# Patient Record
Sex: MALE | Race: WHITE | Employment: FULL TIME | URBAN - METROPOLITAN AREA
[De-identification: names, ages, dates, MRNs, and addresses within clinical notes are randomized per-mention and may not be internally consistent; named-entity substitution may affect disease eponyms.]

---

## 2022-08-02 ENCOUNTER — HOSPITAL ENCOUNTER (EMERGENCY)
Age: 52
Discharge: HOME | End: 2022-08-02
Attending: EMERGENCY MEDICINE
Payer: COMMERCIAL

## 2022-08-02 VITALS
HEART RATE: 75 BPM | BODY MASS INDEX: 27.2 KG/M2 | SYSTOLIC BLOOD PRESSURE: 153 MMHG | WEIGHT: 190 LBS | DIASTOLIC BLOOD PRESSURE: 88 MMHG | OXYGEN SATURATION: 98 % | RESPIRATION RATE: 16 BRPM | TEMPERATURE: 98 F | HEIGHT: 70 IN

## 2022-08-02 DIAGNOSIS — E01.0 THYROMEGALY: Primary | ICD-10-CM

## 2022-08-02 LAB
ALBUMIN SERPL-MCNC: 4.1 G/DL (ref 3.4–5)
ALBUMIN/GLOB SERPL: 1.2 {RATIO} (ref 1–2)
ALP LIVER SERPL-CCNC: 65 U/L
ALT SERPL-CCNC: 26 U/L
ANION GAP SERPL CALC-SCNC: 5 MMOL/L (ref 0–18)
AST SERPL-CCNC: 16 U/L (ref 15–37)
BASOPHILS # BLD AUTO: 0.05 X10(3) UL (ref 0–0.2)
BASOPHILS NFR BLD AUTO: 0.6 %
BILIRUB SERPL-MCNC: 0.5 MG/DL (ref 0.1–2)
BUN BLD-MCNC: 18 MG/DL (ref 7–18)
CALCIUM BLD-MCNC: 9.9 MG/DL (ref 8.5–10.1)
CHLORIDE SERPL-SCNC: 102 MMOL/L (ref 98–112)
CO2 SERPL-SCNC: 30 MMOL/L (ref 21–32)
CREAT BLD-MCNC: 1.23 MG/DL
EOSINOPHIL # BLD AUTO: 0.28 X10(3) UL (ref 0–0.7)
EOSINOPHIL NFR BLD AUTO: 3.4 %
ERYTHROCYTE [DISTWIDTH] IN BLOOD BY AUTOMATED COUNT: 13.1 %
GFR SERPLBLD BASED ON 1.73 SQ M-ARVRAT: 72 ML/MIN/1.73M2 (ref 60–?)
GLOBULIN PLAS-MCNC: 3.5 G/DL (ref 2.8–4.4)
GLUCOSE BLD-MCNC: 88 MG/DL (ref 70–99)
HCT VFR BLD AUTO: 43.2 %
HGB BLD-MCNC: 14.3 G/DL
IMM GRANULOCYTES # BLD AUTO: 0.03 X10(3) UL (ref 0–1)
IMM GRANULOCYTES NFR BLD: 0.4 %
LYMPHOCYTES # BLD AUTO: 2.19 X10(3) UL (ref 1–4)
LYMPHOCYTES NFR BLD AUTO: 26.2 %
MCH RBC QN AUTO: 30.8 PG (ref 26–34)
MCHC RBC AUTO-ENTMCNC: 33.1 G/DL (ref 31–37)
MCV RBC AUTO: 92.9 FL
MONOCYTES # BLD AUTO: 0.82 X10(3) UL (ref 0.1–1)
MONOCYTES NFR BLD AUTO: 9.8 %
NEUTROPHILS # BLD AUTO: 4.98 X10 (3) UL (ref 1.5–7.7)
NEUTROPHILS # BLD AUTO: 4.98 X10(3) UL (ref 1.5–7.7)
NEUTROPHILS NFR BLD AUTO: 59.6 %
OSMOLALITY SERPL CALC.SUM OF ELEC: 285 MOSM/KG (ref 275–295)
PLATELET # BLD AUTO: 299 10(3)UL (ref 150–450)
POTASSIUM SERPL-SCNC: 3.7 MMOL/L (ref 3.5–5.1)
PROT SERPL-MCNC: 7.6 G/DL (ref 6.4–8.2)
RBC # BLD AUTO: 4.65 X10(6)UL
SODIUM SERPL-SCNC: 137 MMOL/L (ref 136–145)
WBC # BLD AUTO: 8.4 X10(3) UL (ref 4–11)

## 2022-08-02 PROCEDURE — 85025 COMPLETE CBC W/AUTO DIFF WBC: CPT | Performed by: EMERGENCY MEDICINE

## 2022-08-02 PROCEDURE — 84439 ASSAY OF FREE THYROXINE: CPT | Performed by: EMERGENCY MEDICINE

## 2022-08-02 PROCEDURE — 99283 EMERGENCY DEPT VISIT LOW MDM: CPT

## 2022-08-02 PROCEDURE — 80053 COMPREHEN METABOLIC PANEL: CPT | Performed by: EMERGENCY MEDICINE

## 2022-08-02 PROCEDURE — 36415 COLL VENOUS BLD VENIPUNCTURE: CPT

## 2022-08-02 PROCEDURE — 84481 FREE ASSAY (FT-3): CPT | Performed by: EMERGENCY MEDICINE

## 2022-08-02 PROCEDURE — 84443 ASSAY THYROID STIM HORMONE: CPT | Performed by: EMERGENCY MEDICINE

## 2022-08-03 LAB
T3FREE SERPL-MCNC: 3.51 PG/ML (ref 2.4–4.2)
T4 FREE SERPL-MCNC: 0.9 NG/DL (ref 0.8–1.7)
TSI SER-ACNC: 3.64 MIU/ML (ref 0.36–3.74)

## 2022-08-03 NOTE — ED INITIAL ASSESSMENT (HPI)
PT to the ED for evaluation of rash/swelling. Pt reports it feels like his anterior neck may be swollen. Reports a feeling of tightness in his neck. Denies feeling swelling or tightness in throat. No MATHEW, no  Difficulty swallowing. Managing own secretions in triage. Notes redness to the anterior neck as well.

## 2024-03-16 ENCOUNTER — HOSPITAL ENCOUNTER (EMERGENCY)
Age: 54
Discharge: HOME | End: 2024-03-16
Attending: EMERGENCY MEDICINE
Payer: COMMERCIAL

## 2024-03-16 ENCOUNTER — APPOINTMENT (OUTPATIENT)
Dept: GENERAL RADIOLOGY | Age: 54
End: 2024-03-16
Payer: COMMERCIAL

## 2024-03-16 VITALS
OXYGEN SATURATION: 99 % | WEIGHT: 190 LBS | HEART RATE: 80 BPM | TEMPERATURE: 99 F | RESPIRATION RATE: 18 BRPM | HEIGHT: 70 IN | SYSTOLIC BLOOD PRESSURE: 144 MMHG | BODY MASS INDEX: 27.2 KG/M2 | DIASTOLIC BLOOD PRESSURE: 80 MMHG

## 2024-03-16 DIAGNOSIS — J40 BRONCHITIS: Primary | ICD-10-CM

## 2024-03-16 DIAGNOSIS — H10.31 ACUTE CONJUNCTIVITIS OF RIGHT EYE, UNSPECIFIED ACUTE CONJUNCTIVITIS TYPE: ICD-10-CM

## 2024-03-16 PROCEDURE — 99284 EMERGENCY DEPT VISIT MOD MDM: CPT

## 2024-03-16 PROCEDURE — 71046 X-RAY EXAM CHEST 2 VIEWS: CPT

## 2024-03-16 PROCEDURE — 93010 ELECTROCARDIOGRAM REPORT: CPT

## 2024-03-16 PROCEDURE — 93005 ELECTROCARDIOGRAM TRACING: CPT

## 2024-03-16 RX ORDER — ATORVASTATIN CALCIUM 10 MG/1
10 TABLET, FILM COATED ORAL NIGHTLY
COMMUNITY
Start: 2024-01-17

## 2024-03-16 RX ORDER — ALBUTEROL SULFATE 90 UG/1
2 AEROSOL, METERED RESPIRATORY (INHALATION) EVERY 4 HOURS PRN
Qty: 1 EACH | Refills: 0 | Status: SHIPPED | OUTPATIENT
Start: 2024-03-16 | End: 2024-04-15

## 2024-03-16 RX ORDER — METHYLPREDNISOLONE 4 MG/1
TABLET ORAL
Qty: 1 EACH | Refills: 0 | Status: SHIPPED | OUTPATIENT
Start: 2024-03-16

## 2024-03-16 RX ORDER — BENZONATATE 200 MG/1
1 CAPSULE ORAL 3 TIMES DAILY PRN
COMMUNITY
Start: 2024-03-13

## 2024-03-16 RX ORDER — CIPROFLOXACIN HYDROCHLORIDE 3.5 MG/ML
2 SOLUTION/ DROPS TOPICAL
Qty: 1 EACH | Refills: 0 | Status: SHIPPED | OUTPATIENT
Start: 2024-03-16 | End: 2024-03-26

## 2024-03-16 NOTE — ED PROVIDER NOTES
Patient Seen in: [facility] In [place]      History     Chief Complaint   Patient presents with    Cough/URI    Chest Pain Angina     Stated Complaint: cough, chest pain when coughing    Subjective:   HPI    CHIEF COMPLAINT: Cough, congestion for 1 week     HISTORY OF PRESENT ILLNESS: Patient is a 53-year-old male who presents for evaluation of cough and congestion for the past 1 week.  4 days ago he was seen and evaluated for the same.  Was tested for flu, COVID, RSV and was told all were negative and he should just wait it out.  He started having some chest pain when he coughs so he thought he should get rechecked.  No documented fevers but has been alternating between sweats and chills.  No chest pain at rest or with exertion.  No shortness of breath.  No vomiting or diarrhea.  Reports some drainage from the eyes as well.     REVIEW OF SYSTEMS:  Constitutional: As above  Eyes: As above  ENT: no sore throat  Cardiovascular: no palpitations  Respiratory: As above  Gastrointestinal: no abdominal pain, no vomiting  Genitourinary: no hematuria  Musculoskeletal: no back pain  Skin: no rashes  Neurological: no headache     Otherwise a complete review of systems was obtained and other than the HPI was negative     The patient's medication list, past medical history and social history elements is as listed in today's nurse's notes are reviewed and agree. The patient's family history is reviewed and is noncontributory to the presenting problem, except as indicated as above.    Objective:   History reviewed. No pertinent past medical history.           Past Surgical History:   Procedure Laterality Date    OTHER SURGICAL HISTORY      s/p wisdom tooth extractions    VASECTOMY                  Social History     Socioeconomic History    Marital status:     Number of children: 3   Occupational History    Occupation:    Tobacco Use    Smoking status: Some Days     Types: Cigars     Smokeless tobacco: Never   Vaping Use    Vaping Use: Never used   Substance and Sexual Activity    Alcohol use: Yes     Alcohol/week: 1.7 - 2.5 standard drinks of alcohol     Types: 2 - 3 Standard drinks or equivalent per week     Comment: occasional (2-3 beer/wine weekly)    Drug use: No              Review of Systems    Positive for stated complaint: cough, chest pain when coughing  Other systems are as noted in HPI.  Constitutional and vital signs reviewed.      All other systems reviewed and negative except as noted above.    Physical Exam     ED Triage Vitals [03/16/24 1352]   /86   Pulse 88   Resp 18   Temp 98.5 °F (36.9 °C)   Temp src Oral   SpO2 98 %   O2 Device None (Room air)       Current:/80   Pulse 80   Temp 98.5 °F (36.9 °C) (Oral)   Resp 18   Ht 177.8 cm (5' 10\")   Wt 86.2 kg   SpO2 99%   BMI 27.26 kg/m²         Physical Exam    Vital signs and nursing notes reviewed  General Appearance: Patient is alert and oriented x4 in no acute distress  Eyes: pupils equal and round, reactive to light. + injection, right greater than the left, no sclera icterus.  No purulent drainage noted.  Throat: There is no erythema or exudates, no tonsillar hypertrophy.  no trismus or stridor. Uvula midline. No phonation changes, patient handling secretions well. Mucous membranes moist.  Respiratory: there are no retractions, lungs are clear to auscultation  Cardiovascular: regular rate and rhythm  Chest wall: There is reproducible tenderness to palpation along the left sternal border.  No overlying edema, erythema, warmth or ecchymosis.  No crepitus.  No vesicles or lesions.  Neurological:  Grossly intact, no deficits.  Gait normal.  Skin:  warm and dry, no rashes.  No jaundice. Brisk capillary refill  Musculoskeletal: neck is supple, no lymphadenopathy, non tender. no meningeal signs.  Extremities are symmetrical, full range of motion  Psychiatric: calm and cooperative      ED Course   Labs Reviewed - No  data to display  EKG    Rate, intervals and axes as noted on EKG Report.  Rate: 91 bpm  Rhythm: Sinus Rhythm  Reading: normal sinus rhythm, no evidence of ischemia                 XR CHEST PA + LAT CHEST (CPT=71046)    Result Date: 3/16/2024  PROCEDURE:  XR CHEST PA + LAT CHEST (CPT=71046)  INDICATIONS:  cough, chest pain when coughing  COMPARISON:  None.  TECHNIQUE:  PA and lateral chest radiographs were obtained.  PATIENT STATED HISTORY: (As transcribed by Technologist)  Pt c/o left sided chest pain that began yesterday and cough for 1 week.    FINDINGS:  Heart size is within normal limits.  Pleural spaces appear clear.  Mediastinal and hilar contours are normal.  No focal consolidation.  If clinical symptoms persist then recommend follow-up imaging.            CONCLUSION:  See above.   LOCATION:  [facility]   Dictated by (CST): Mars Reza MD on 3/16/2024 at 2:51 PM     Finalized by (CST): Mars Reza MD on 3/16/2024 at 2:52 PM        I personally reviewed the chest x-ray images.  No consolidation appreciated.         MDM      This is a well-appearing 53-year-old male who presents for evaluation of 1 week of URI symptoms and some chest pain with cough.  Patient had an EKG which showed normal sinus rhythm, no evidence of ischemia.  Chest x-ray showed no focal consolidation.  Symptoms consistent with viral etiology.  Will treat for bronchitis.  ProAir and Medrol Dosepak as prescribed.  Tessalon Perles for cough management as previously prescribed by PCP.  Ciloxan ophthalmic drops as directed.  Close follow-up with primary care.  If there are any new, changing or worsening symptoms go to the ER.  Patient voiced understanding to the treatment plan.  All questions answered. This patient was seen and evaluated with Dr. Aguilar who agrees with the disposition and plan.                                   Medical Decision Making  Amount and/or Complexity of Data Reviewed  External Data Reviewed: notes.     Details: Reviewed  primary care note and plan of care from 3/13/2024.  Viral etiology.  Lucy Vasquez for cough management.  Radiology: ordered and independent interpretation performed. Decision-making details documented in ED Course.    Risk  Prescription drug management.        Disposition and Plan     Clinical Impression:  1. Bronchitis    2. Acute conjunctivitis of right eye, unspecified acute conjunctivitis type         Disposition:  Discharge  3/16/2024  3:21 pm    Follow-up:  Cj Cabrera MD  [address]  [address]  [facility] [address]  [phone]    Follow up in 3 day(s)  If symptoms worsen          Medications Prescribed:  Current Discharge Medication List        START taking these medications    Details   albuterol 108 (90 Base) MCG/ACT Inhalation Aero Soln Inhale 2 puffs into the lungs every 4 (four) hours as needed.  Qty: 1 each, Refills: 0      methylPREDNISolone (MEDROL) 4 MG Oral Tablet Therapy Pack Dosepack: take as directed  Qty: 1 each, Refills: 0      ciprofloxacin 0.3 % Ophthalmic Solution Apply 2 drops to eye every 2 (two) hours while awake for 10 days.  Qty: 1 each, Refills: 0

## 2024-03-16 NOTE — ED INITIAL ASSESSMENT (HPI)
Patient here with cough over the past week. Seen by primary and tested negative for flu, covid and rsv. Started with chest pain over the past 24 hours, worse when he coughs.

## 2024-03-16 NOTE — DISCHARGE INSTRUCTIONS
Use the inhaler as needed for chest tightness or wheezing.  Steroid daily as directed.  Tessalon Perles as needed for cough management.    Follow up with your primary doctor.     If you have new, changing or worsening symptoms, please go directly to the ER.

## 2024-03-18 LAB
ATRIAL RATE: 91 BPM
P AXIS: 63 DEGREES
P-R INTERVAL: 142 MS
Q-T INTERVAL: 342 MS
QRS DURATION: 96 MS
QTC CALCULATION (BEZET): 420 MS
R AXIS: 72 DEGREES
T AXIS: 40 DEGREES
VENTRICULAR RATE: 91 BPM